# Patient Record
Sex: FEMALE | Race: WHITE | NOT HISPANIC OR LATINO | URBAN - METROPOLITAN AREA
[De-identification: names, ages, dates, MRNs, and addresses within clinical notes are randomized per-mention and may not be internally consistent; named-entity substitution may affect disease eponyms.]

---

## 2023-11-01 ENCOUNTER — OFFICE VISIT (OUTPATIENT)
Dept: FAMILY MEDICINE | Facility: CLINIC | Age: 18
End: 2023-11-01
Payer: COMMERCIAL

## 2023-11-01 VITALS
HEART RATE: 71 BPM | SYSTOLIC BLOOD PRESSURE: 110 MMHG | WEIGHT: 153 LBS | BODY MASS INDEX: 27.11 KG/M2 | TEMPERATURE: 99 F | DIASTOLIC BLOOD PRESSURE: 69 MMHG | HEIGHT: 63 IN

## 2023-11-01 DIAGNOSIS — J45.40 MODERATE PERSISTENT ASTHMA WITHOUT COMPLICATION: ICD-10-CM

## 2023-11-01 DIAGNOSIS — Z23 INFLUENZA VACCINE NEEDED: Primary | ICD-10-CM

## 2023-11-01 PROCEDURE — 99204 PR OFFICE/OUTPT VISIT, NEW, LEVL IV, 45-59 MIN: ICD-10-PCS | Mod: 25,S$GLB,, | Performed by: INTERNAL MEDICINE

## 2023-11-01 PROCEDURE — 99999 PR PBB SHADOW E&M-NEW PATIENT-LVL III: CPT | Mod: PBBFAC,,, | Performed by: INTERNAL MEDICINE

## 2023-11-01 PROCEDURE — 90471 FLU VACCINE (QUAD) GREATER THAN OR EQUAL TO 3YO PRESERVATIVE FREE IM: ICD-10-PCS | Mod: S$GLB,,, | Performed by: INTERNAL MEDICINE

## 2023-11-01 PROCEDURE — 3008F BODY MASS INDEX DOCD: CPT | Mod: CPTII,S$GLB,, | Performed by: INTERNAL MEDICINE

## 2023-11-01 PROCEDURE — 1159F MED LIST DOCD IN RCRD: CPT | Mod: CPTII,S$GLB,, | Performed by: INTERNAL MEDICINE

## 2023-11-01 PROCEDURE — 3074F SYST BP LT 130 MM HG: CPT | Mod: CPTII,S$GLB,, | Performed by: INTERNAL MEDICINE

## 2023-11-01 PROCEDURE — 3078F DIAST BP <80 MM HG: CPT | Mod: CPTII,S$GLB,, | Performed by: INTERNAL MEDICINE

## 2023-11-01 PROCEDURE — 3008F PR BODY MASS INDEX (BMI) DOCUMENTED: ICD-10-PCS | Mod: CPTII,S$GLB,, | Performed by: INTERNAL MEDICINE

## 2023-11-01 PROCEDURE — 3078F PR MOST RECENT DIASTOLIC BLOOD PRESSURE < 80 MM HG: ICD-10-PCS | Mod: CPTII,S$GLB,, | Performed by: INTERNAL MEDICINE

## 2023-11-01 PROCEDURE — 90686 IIV4 VACC NO PRSV 0.5 ML IM: CPT | Mod: S$GLB,,, | Performed by: INTERNAL MEDICINE

## 2023-11-01 PROCEDURE — 3074F PR MOST RECENT SYSTOLIC BLOOD PRESSURE < 130 MM HG: ICD-10-PCS | Mod: CPTII,S$GLB,, | Performed by: INTERNAL MEDICINE

## 2023-11-01 PROCEDURE — 99204 OFFICE O/P NEW MOD 45 MIN: CPT | Mod: 25,S$GLB,, | Performed by: INTERNAL MEDICINE

## 2023-11-01 PROCEDURE — 99999 PR PBB SHADOW E&M-NEW PATIENT-LVL III: ICD-10-PCS | Mod: PBBFAC,,, | Performed by: INTERNAL MEDICINE

## 2023-11-01 PROCEDURE — 1159F PR MEDICATION LIST DOCUMENTED IN MEDICAL RECORD: ICD-10-PCS | Mod: CPTII,S$GLB,, | Performed by: INTERNAL MEDICINE

## 2023-11-01 PROCEDURE — 90471 IMMUNIZATION ADMIN: CPT | Mod: S$GLB,,, | Performed by: INTERNAL MEDICINE

## 2023-11-01 PROCEDURE — 90686 FLU VACCINE (QUAD) GREATER THAN OR EQUAL TO 3YO PRESERVATIVE FREE IM: ICD-10-PCS | Mod: S$GLB,,, | Performed by: INTERNAL MEDICINE

## 2023-11-01 RX ORDER — ALBUTEROL SULFATE 90 UG/1
2 AEROSOL, METERED RESPIRATORY (INHALATION) EVERY 6 HOURS PRN
COMMUNITY

## 2023-11-01 NOTE — PROGRESS NOTES
Assessment/Plan:    Problem List Items Addressed This Visit          Pulmonary    Moderate persistent asthma without complication    Overview     -chronic  -symptoms stable  -remains on BID Dulera  -has PRN DONATO inhaler but denies frequent use  -no longer following with pulmonology          Other Visit Diagnoses       Influenza vaccine needed    -  Primary    Relevant Orders    Influenza - Quadrivalent (PF)            Angelita Allred MD  ______________________________________________________________________________________________________________________________    CC: Establish care    HPI:    Patient is a new patient to me here to establish care.     Previous PCP: pediatrics    Patient is a 18 yo WF with PMH of asthma. Patient diagnosed in childhood. Was followed by pulmonology but has not required to see them in years. She is on a maintenance inhaler but doing well. Has albuterol rescue inhaler but rarely has to use. Denies any current symptoms of SOB, wheezing or cough.     No other new complaints today.  Remaining chronic conditions have been reviewed and remain stable. Further detail as stated above.       reviewed. Flu shot today. Discussed other recommended vaccines.     Past Medical History:  Past Medical History:   Diagnosis Date    Asthma      Past Surgical History:   Procedure Laterality Date    ADENOIDECTOMY  2006    tubes 3 times    MYRINGOTOMY W/ TUBES      TONSILLECTOMY  Oct 2020    WISDOM TOOTH EXTRACTION       Review of patient's allergies indicates:  No Known Allergies  Social History     Tobacco Use    Smoking status: Never    Smokeless tobacco: Never    Tobacco comments:     NEVER   Substance Use Topics    Alcohol use: Not Currently    Drug use: Never     Family History   Problem Relation Age of Onset    Asthma Mother     Miscarriages / Stillbirths Mother     Hearing loss Father     Hypertension Father     Asthma Sister     Asthma Sister      Current Outpatient Medications on  "File Prior to Visit   Medication Sig Dispense Refill    albuterol (PROVENTIL/VENTOLIN HFA) 90 mcg/actuation inhaler Inhale 2 puffs into the lungs every 6 (six) hours as needed. Rescue      mometasone-formoterol (DULERA) 100-5 mcg/actuation HFAA Inhale 1 puff into the lungs.      [DISCONTINUED] clobetasoL (TEMOVATE) 0.05 % external solution Apply topically 2 (two) times daily. 50 mL 3    [DISCONTINUED] ketoconazole (NIZORAL) 2 % shampoo Apply to scalp 2-3 times per week and lather, let sit for 5 minutes prior to rinsing. 120 mL 3     No current facility-administered medications on file prior to visit.       Review of Systems   Constitutional:  Negative for chills, diaphoresis, fatigue and fever.   HENT:  Negative for congestion, ear pain, postnasal drip, sinus pain and sore throat.    Eyes:  Negative for pain and redness.   Respiratory:  Negative for cough, chest tightness and shortness of breath.    Cardiovascular:  Negative for chest pain and leg swelling.   Gastrointestinal:  Negative for abdominal pain, constipation, diarrhea, nausea and vomiting.   Genitourinary:  Negative for dysuria and hematuria.   Musculoskeletal:  Negative for arthralgias and joint swelling.   Skin:  Negative for rash.   Neurological:  Negative for dizziness, syncope and headaches.   Psychiatric/Behavioral:  Negative for dysphoric mood. The patient is not nervous/anxious.      Vitals:    11/01/23 1413   BP: 110/69   Pulse: 71   Temp: 98.6 °F (37 °C)   Weight: 69.4 kg (153 lb)   Height: 5' 3" (1.6 m)       Wt Readings from Last 3 Encounters:   11/01/23 69.4 kg (153 lb) (82 %, Z= 0.93)*   06/08/23 66.2 kg (146 lb) (77 %, Z= 0.74)*   07/29/11 24.1 kg (53 lb 1.7 oz) (46 %, Z= -0.09)*     * Growth percentiles are based on [facility] (Girls, 2-20 Years) data.       Physical Exam  Constitutional:       General: She is not in acute distress.     Appearance: Normal appearance. She is well-developed.   HENT:      Head: Normocephalic and atraumatic. "   Eyes:      Conjunctiva/sclera: Conjunctivae normal.   Cardiovascular:      Rate and Rhythm: Normal rate and regular rhythm.      Pulses: Normal pulses.      Heart sounds: Normal heart sounds. No murmur heard.  Pulmonary:      Effort: Pulmonary effort is normal. No respiratory distress.      Breath sounds: Normal breath sounds. No stridor. No wheezing or rhonchi.   Abdominal:      General: Bowel sounds are normal. There is no distension.      Palpations: Abdomen is soft.      Tenderness: There is no abdominal tenderness.   Musculoskeletal:         General: Normal range of motion.      Cervical back: Normal range of motion and neck supple.   Skin:     General: Skin is warm and dry.      Findings: No rash.   Neurological:      General: No focal deficit present.      Mental Status: She is alert and oriented to person, place, and time.   Psychiatric:         Mood and Affect: Mood normal.         Behavior: Behavior normal.     Health Maintenance   Topic Date Due    Hepatitis C Screening  Never done    Lipid Panel  Never done    HPV Vaccines (1 - 2-dose series) Never done    TETANUS VACCINE  04/12/2031

## 2023-12-12 ENCOUNTER — OFFICE VISIT (OUTPATIENT)
Dept: PRIMARY CARE CLINIC | Facility: CLINIC | Age: 18
End: 2023-12-12
Payer: COMMERCIAL

## 2023-12-12 VITALS
HEART RATE: 84 BPM | DIASTOLIC BLOOD PRESSURE: 84 MMHG | BODY MASS INDEX: 27.44 KG/M2 | SYSTOLIC BLOOD PRESSURE: 122 MMHG | TEMPERATURE: 98 F | OXYGEN SATURATION: 100 % | HEIGHT: 63 IN | WEIGHT: 154.88 LBS

## 2023-12-12 DIAGNOSIS — R10.13 DYSPEPSIA: Primary | ICD-10-CM

## 2023-12-12 PROCEDURE — 99214 PR OFFICE/OUTPT VISIT, EST, LEVL IV, 30-39 MIN: ICD-10-PCS | Mod: S$GLB,,, | Performed by: INTERNAL MEDICINE

## 2023-12-12 PROCEDURE — 99999 PR PBB SHADOW E&M-EST. PATIENT-LVL IV: ICD-10-PCS | Mod: PBBFAC,,, | Performed by: INTERNAL MEDICINE

## 2023-12-12 PROCEDURE — 3008F BODY MASS INDEX DOCD: CPT | Mod: CPTII,S$GLB,, | Performed by: INTERNAL MEDICINE

## 2023-12-12 PROCEDURE — 3074F PR MOST RECENT SYSTOLIC BLOOD PRESSURE < 130 MM HG: ICD-10-PCS | Mod: CPTII,S$GLB,, | Performed by: INTERNAL MEDICINE

## 2023-12-12 PROCEDURE — 3079F DIAST BP 80-89 MM HG: CPT | Mod: CPTII,S$GLB,, | Performed by: INTERNAL MEDICINE

## 2023-12-12 PROCEDURE — 3079F PR MOST RECENT DIASTOLIC BLOOD PRESSURE 80-89 MM HG: ICD-10-PCS | Mod: CPTII,S$GLB,, | Performed by: INTERNAL MEDICINE

## 2023-12-12 PROCEDURE — 99999 PR PBB SHADOW E&M-EST. PATIENT-LVL IV: CPT | Mod: PBBFAC,,, | Performed by: INTERNAL MEDICINE

## 2023-12-12 PROCEDURE — 99214 OFFICE O/P EST MOD 30 MIN: CPT | Mod: S$GLB,,, | Performed by: INTERNAL MEDICINE

## 2023-12-12 PROCEDURE — 3074F SYST BP LT 130 MM HG: CPT | Mod: CPTII,S$GLB,, | Performed by: INTERNAL MEDICINE

## 2023-12-12 PROCEDURE — 1159F PR MEDICATION LIST DOCUMENTED IN MEDICAL RECORD: ICD-10-PCS | Mod: CPTII,S$GLB,, | Performed by: INTERNAL MEDICINE

## 2023-12-12 PROCEDURE — 1159F MED LIST DOCD IN RCRD: CPT | Mod: CPTII,S$GLB,, | Performed by: INTERNAL MEDICINE

## 2023-12-12 PROCEDURE — 3008F PR BODY MASS INDEX (BMI) DOCUMENTED: ICD-10-PCS | Mod: CPTII,S$GLB,, | Performed by: INTERNAL MEDICINE

## 2023-12-12 RX ORDER — PANTOPRAZOLE SODIUM 40 MG/1
40 TABLET, DELAYED RELEASE ORAL DAILY
Qty: 14 TABLET | Refills: 0 | Status: SHIPPED | OUTPATIENT
Start: 2023-12-12 | End: 2023-12-26

## 2023-12-12 NOTE — PROGRESS NOTES
Assessment/Plan:    Problem List Items Addressed This Visit    None  Visit Diagnoses       Dyspepsia    -  Primary  -start PPI x 2 weeks  -start on daily probiotic as well  -provided a list of foods to avoid  -if no improvement in symptoms, will move forward with further work up    Relevant Medications    pantoprazole (PROTONIX) 40 MG tablet            Follow up if symptoms worsen or fail to improve.    Angelita Allred MD  _____________________________________________________________________________________________________________________________________________________    CC: GI complaint    HPI:    Patient is in clinic today as an established patient.    Complaint of nausea, irregular BM's and increased has x 6 weeks. Symptoms started after a weekend with a lot of different parties/showers and ate a lot of food that she does not normally eat. Reports symptoms initially started with N/V, lasting for one night. No longer having episodes of vomiting but continuing to have nausea, however this has improved some. Continuing to have irregular BM's since then. Reports sometimes intermittent constipation and loose stool. She denies ever having any similar episodes in the past. BM's have always been regular. Denies blood in stool. Reports increased gas production but denies any significant abdominal pain. She is now on her usual regular diet. Denies any new medications or supplements.     No other new complaints today.      Current Outpatient Medications on File Prior to Visit   Medication Sig Dispense Refill    albuterol (PROVENTIL/VENTOLIN HFA) 90 mcg/actuation inhaler Inhale 2 puffs into the lungs every 6 (six) hours as needed. Rescue      mometasone-formoterol (DULERA) 100-5 mcg/actuation HFAA Inhale 1 puff into the lungs.       No current facility-administered medications on file prior to visit.       Review of Systems   Constitutional:  Negative for chills, diaphoresis, fatigue and fever.   HENT:  Negative for  "congestion, ear pain, postnasal drip, sinus pain and sore throat.    Eyes:  Negative for pain and redness.   Respiratory:  Negative for cough, chest tightness and shortness of breath.    Cardiovascular:  Negative for chest pain and leg swelling.   Gastrointestinal:  Positive for constipation and nausea. Negative for abdominal pain, diarrhea and vomiting.   Genitourinary:  Negative for dysuria and hematuria.   Musculoskeletal:  Negative for arthralgias and joint swelling.   Skin:  Negative for rash.   Neurological:  Negative for dizziness, syncope and headaches.   Psychiatric/Behavioral:  Negative for dysphoric mood. The patient is not nervous/anxious.      Vitals:    12/12/23 0749   BP: 122/84   Pulse: 84   Temp: 98.2 °F (36.8 °C)   TempSrc: Temporal   SpO2: 100%   Weight: 70.2 kg (154 lb 14 oz)   Height: 5' 3" (1.6 m)       Wt Readings from Last 3 Encounters:   12/12/23 70.2 kg (154 lb 14 oz) (84 %, Z= 0.98)*   11/01/23 69.4 kg (153 lb) (82 %, Z= 0.93)*   06/08/23 66.2 kg (146 lb) (77 %, Z= 0.74)*     * Growth percentiles are based on CDC (Girls, 2-20 Years) data.       Physical Exam  Constitutional:       General: She is not in acute distress.     Appearance: Normal appearance. She is well-developed.   HENT:      Head: Normocephalic and atraumatic.   Eyes:      Conjunctiva/sclera: Conjunctivae normal.   Cardiovascular:      Rate and Rhythm: Normal rate and regular rhythm.      Pulses: Normal pulses.      Heart sounds: Normal heart sounds. No murmur heard.  Pulmonary:      Effort: Pulmonary effort is normal. No respiratory distress.      Breath sounds: Normal breath sounds.   Abdominal:      General: Bowel sounds are normal. There is no distension.      Palpations: Abdomen is soft. There is no mass.      Tenderness: There is no abdominal tenderness. There is no guarding or rebound.      Hernia: No hernia is present.   Musculoskeletal:         General: Normal range of motion.      Cervical back: Normal range of " motion and neck supple.   Skin:     General: Skin is warm and dry.      Findings: No rash.   Neurological:      General: No focal deficit present.      Mental Status: She is alert and oriented to person, place, and time.   Psychiatric:         Mood and Affect: Mood normal.         Behavior: Behavior normal.     Health Maintenance   Topic Date Due    Hepatitis C Screening  Never done    Lipid Panel  Never done    HPV Vaccines (1 - 2-dose series) Never done    TETANUS VACCINE  04/12/2031

## 2023-12-12 NOTE — PATIENT INSTRUCTIONS
-start pantoprazole x 2 weeks  -start daily probiotic, such as Align  -avoid foods that cause increase gas production; see below:    Foods commonly associated with gas and bloating  Milk and dairy products Cheese (may/may not relate to lactose), ice cream, milk   Vegetables Asparagus, broccoli, brussel sprouts, cabbage, cauliflower, celery, corn, cucumber, kohlrabi, leeks, onions, parsnips, potatoes, radishes, rutabaga, turnips   Fruits Apples, apricots, bananas, peaches, pears, prunes, raisins   Whole grains Bagels, bran/bran cereal, pretzels, wheat and oats, wheat germ   Legumes Baked beans, beans, lima beans, peas, soybeans   Fatty foods Fried foods, pork*   Liquids Beer, carbonated beverages, carbonated medications   Miscellaneous Artificial sweeteners, chewing gum   * Pork is associated with foul-smelling gas.  Graphic 744532 Version 2.0  © 2023 [employer] and/or its affiliates. All Rights Reserved.

## 2023-12-21 ENCOUNTER — PATIENT MESSAGE (OUTPATIENT)
Dept: PRIMARY CARE CLINIC | Facility: CLINIC | Age: 18
End: 2023-12-21
Payer: COMMERCIAL

## 2024-10-22 ENCOUNTER — E-VISIT (OUTPATIENT)
Dept: PRIMARY CARE CLINIC | Facility: CLINIC | Age: 19
End: 2024-10-22
Payer: COMMERCIAL

## 2024-10-22 DIAGNOSIS — J40 BRONCHITIS: Primary | ICD-10-CM

## 2024-10-22 DIAGNOSIS — J45.901 EXACERBATION OF ASTHMA, UNSPECIFIED ASTHMA SEVERITY, UNSPECIFIED WHETHER PERSISTENT: ICD-10-CM

## 2024-10-24 RX ORDER — PROMETHAZINE HYDROCHLORIDE AND DEXTROMETHORPHAN HYDROBROMIDE 6.25; 15 MG/5ML; MG/5ML
5 SYRUP ORAL EVERY 6 HOURS PRN
Qty: 118 ML | Refills: 0 | Status: SHIPPED | OUTPATIENT
Start: 2024-10-24 | End: 2024-11-03

## 2024-10-24 RX ORDER — PREDNISONE 20 MG/1
40 TABLET ORAL DAILY
Qty: 10 TABLET | Refills: 0 | Status: SHIPPED | OUTPATIENT
Start: 2024-10-24 | End: 2024-10-29

## 2024-10-24 NOTE — PROGRESS NOTES
Patient ID: Chichi Mota is a 18 y.o. female.    Chief Complaint: General Illness (Entered automatically based on patient selection in [employer].)    The patient initiated a request through [employer] on 10/22/2024 for evaluation and management with a chief complaint of General Illness (Entered automatically based on patient selection in [employer].)     I evaluated the questionnaire submission on 10/22/24.    [facility]-Cough And Cold    10/22/2024  1:36 PM CDT - Filed by Patient   What do you need help with? Asthma   Do you agree to participate in an E-Visit? Yes   If you have any of the following symptoms, please present to your local emergency room or call 911:  I acknowledge   Are you pregnant, could you be pregnant, or are you breast feeding? None of the above   What is the main issue you would like addressed today? Cough - tightness in Chest   Please describe your symptoms Cough, mucus, running nose, - voice gone, started with sore throat, threw up from coughing   Where is your problem located? Chest   How severe are your symptoms? Moderate   Have you had these symptoms before? Yes   How long have you been having these symptoms? For a week   Please list any medications or treatments you have used for your condition and indicate if it was effective or not. Dulera 100mcg/5mcg -  2 puffs twice daily;  Robitusin   What makes this feel better? Inhaler made me cough mucus up;  water   What makes this feel worse? Not being able to cough   Are these symptoms related to a condition that you currently have? Yes   What is the condition? Asthma, Haven't had any issues until I got a sore Throat   When were you last seen for this condition?    Please describe any probable cause for these symptoms Haven't been using inhaler as I've had no issues   Provide any additional information you feel is important.    Please attach any relevant images or files    Are you able to take your vital signs? Yes   Systolic Blood  Pressure: 126   Diastolic Blood Pressure: 89   Weight: 165   Height: 63   Pulse: 122   Temperature: 97.8   Respiration rate:    Pulse Oxygen:           Active Problem List with Overview Notes    Diagnosis Date Noted    Moderate persistent asthma without complication 11/01/2023     -chronic  -symptoms stable  -remains on BID Dulera  -has PRN DONATO inhaler but denies frequent use  -no longer following with pulmonology        Recent Labs Obtained:  No visits with results within 7 Day(s) from this visit.   Latest known visit with results is:   Orders Only on 11/28/2011   Component Date Value Ref Range Status    WBC 11/28/2011 6.01  4.5 - 13.5 K/uL Final    RBC 11/28/2011 4.58  4.0 - 5.2 M/uL Final    Hemoglobin 11/28/2011 13.1  11.5 - 15.5 gm/dl Final    Hematocrit 11/28/2011 38.3  35.0 - 45.0 % Final    MCV 11/28/2011 83.6  77 - 95 fL Final    MCH 11/28/2011 28.6  25 - 33 pg Final    MCHC 11/28/2011 34.2  31 - 36 % Final    RDW 11/28/2011 12.6  11.5 - 14.5 % Final    Gran % 11/28/2011 47.4  33 - 59 % Final    Lymph % 11/28/2011 40.1  33 - 50 % Final    Mono % 11/28/2011 9.0 (H)  0.0 - 5.9 % Final    Eosinophil % 11/28/2011 2.8  0.0 - 3.7 % Final    Basophil % 11/28/2011 0.7  0 - 1.5 % Final    Gran # (ANC) 11/28/2011 2.9  1.5 - 8.0 K/uL Final    Lymph # 11/28/2011 2.4  1.5 - 6.8 K/uL Final    Mono # 11/28/2011 0.5  0.0 - 0.8 K/uL Final    Eos # 11/28/2011 0.2  0 - 0.5 K/uL Final    Baso # 11/28/2011 0.0  0.0 - 0.2 K/uL Final    Platelets 11/28/2011 394 (H)  150 - 350 K/uL Final    MPV 11/28/2011 10.0  9.2 - 12.9 fL Final    IgG 11/28/2011 517 (L)  650 - 1600 mg/dl Final    Cord Blood Reference Range: 650 - 1600 mg/dL    IgA 11/28/2011 97  35 - 200 mg/dl Final    Cord Blood Reference Range: < 5 mg/dL    IgM 11/28/2011 138  45 - 200 mg/dl Final    Cord Blood Reference Range: < 25mg/dL    Total IgE 11/28/2011 < 35.0  0 - 90 IU/ml Final    Tetanus Toxoid IgG Ab 11/28/2011 0.74  IU/mL Final    Comment: Tetanus Toxoid IgG Ab:                             Research Use Only                            The minimum level of protective antibody in the normal                            population is between 0.01 and 0.15 IU/mL. The majority                            of vaccinated individuals should demonstrate protective                            levels of antibody > 0.15 IU/mL.     S.pneumoniae Type 1 11/28/2011 2.8  >1.0 ug/mL Final    S.pneumoniae Type 3 11/28/2011 1.8  >1.0 ug/mL Final    Strep pneumo Type 4 11/28/2011 2.0  >1.0 ug/mL Final    S.pneumoniae Type 5 11/28/2011 5.2  >1.0 ug/mL Final    S.pneumoniae Type 6B 11/28/2011 17.2  >1.0 ug/mL Final    S.pneumoniae Type 7F 11/28/2011 4.5  >1.0 ug/mL Final    S.pneumoniae Type 8 11/28/2011 1.7  >1.0 ug/mL Final    S.pneumoniae Type 9N 11/28/2011 0.2 (A)  >1.0 ug/mL Final    S.pneumoniae Type 9V Abs 11/28/2011 3.8  >1.0 ug/mL Final    S.pneumoniae Type 12F 11/28/2011 <0.2 (A)  >1.0 ug/mL Final    Strep pneumo Type 14 11/28/2011 5.8  >1.0 ug/mL Final    S.pneumoniae Type 18C 11/28/2011 1.6  >1.0 ug/mL Final    S.pneumoniae Type 19F 11/28/2011 9.8  >1.0 ug/mL Final    S.pneumoniae Type 23F 11/28/2011 2.3  >1.0 ug/mL Final       Encounter Diagnoses   Name Primary?    Bronchitis Yes    Exacerbation of asthma, unspecified asthma severity, unspecified whether persistent         No orders of the defined types were placed in this encounter.     Medications Ordered This Encounter   Medications    predniSONE (DELTASONE) 20 MG tablet     Sig: Take 2 tablets (40 mg total) by mouth once daily. for 5 days     Dispense:  10 tablet     Refill:  0    promethazine-dextromethorphan (PROMETHAZINE-DM) 6.25-15 mg/5 mL Syrp     Sig: Take 5 mLs by mouth every 6 (six) hours as needed (cough).     Dispense:  118 mL     Refill:  0        E-Visit Time Tracking:

## 2025-03-11 ENCOUNTER — OFFICE VISIT (OUTPATIENT)
Dept: PRIMARY CARE CLINIC | Facility: CLINIC | Age: 20
End: 2025-03-11
Payer: COMMERCIAL

## 2025-03-11 VITALS
HEIGHT: 63 IN | SYSTOLIC BLOOD PRESSURE: 118 MMHG | WEIGHT: 162.69 LBS | BODY MASS INDEX: 28.82 KG/M2 | HEART RATE: 96 BPM | OXYGEN SATURATION: 99 % | TEMPERATURE: 98 F | DIASTOLIC BLOOD PRESSURE: 80 MMHG

## 2025-03-11 DIAGNOSIS — Z11.4 SCREENING FOR HIV (HUMAN IMMUNODEFICIENCY VIRUS): ICD-10-CM

## 2025-03-11 DIAGNOSIS — Z13.220 ENCOUNTER FOR LIPID SCREENING FOR CARDIOVASCULAR DISEASE: ICD-10-CM

## 2025-03-11 DIAGNOSIS — Z00.00 ENCOUNTER FOR ANNUAL HEALTH EXAMINATION: Primary | ICD-10-CM

## 2025-03-11 DIAGNOSIS — Z13.1 DIABETES MELLITUS SCREENING: ICD-10-CM

## 2025-03-11 DIAGNOSIS — Z13.6 ENCOUNTER FOR LIPID SCREENING FOR CARDIOVASCULAR DISEASE: ICD-10-CM

## 2025-03-11 DIAGNOSIS — Z11.59 ENCOUNTER FOR HEPATITIS C SCREENING TEST FOR LOW RISK PATIENT: ICD-10-CM

## 2025-03-11 DIAGNOSIS — R21 RASH: ICD-10-CM

## 2025-03-11 PROCEDURE — 3079F DIAST BP 80-89 MM HG: CPT | Mod: CPTII,S$GLB,, | Performed by: INTERNAL MEDICINE

## 2025-03-11 PROCEDURE — 3044F HG A1C LEVEL LT 7.0%: CPT | Mod: CPTII,S$GLB,, | Performed by: INTERNAL MEDICINE

## 2025-03-11 PROCEDURE — 99999 PR PBB SHADOW E&M-EST. PATIENT-LVL IV: CPT | Mod: PBBFAC,,, | Performed by: INTERNAL MEDICINE

## 2025-03-11 PROCEDURE — 3008F BODY MASS INDEX DOCD: CPT | Mod: CPTII,S$GLB,, | Performed by: INTERNAL MEDICINE

## 2025-03-11 PROCEDURE — 99395 PREV VISIT EST AGE 18-39: CPT | Mod: S$GLB,,, | Performed by: INTERNAL MEDICINE

## 2025-03-11 PROCEDURE — 1159F MED LIST DOCD IN RCRD: CPT | Mod: CPTII,S$GLB,, | Performed by: INTERNAL MEDICINE

## 2025-03-11 PROCEDURE — 3074F SYST BP LT 130 MM HG: CPT | Mod: CPTII,S$GLB,, | Performed by: INTERNAL MEDICINE

## 2025-03-11 RX ORDER — TRIAMCINOLONE ACETONIDE 1 MG/G
OINTMENT TOPICAL 2 TIMES DAILY
Qty: 30 G | Refills: 0 | Status: SHIPPED | OUTPATIENT
Start: 2025-03-11

## 2025-03-14 ENCOUNTER — LAB VISIT (OUTPATIENT)
Dept: LAB | Facility: HOSPITAL | Age: 20
End: 2025-03-14
Attending: INTERNAL MEDICINE
Payer: COMMERCIAL

## 2025-03-14 DIAGNOSIS — Z13.6 ENCOUNTER FOR LIPID SCREENING FOR CARDIOVASCULAR DISEASE: ICD-10-CM

## 2025-03-14 DIAGNOSIS — Z13.1 DIABETES MELLITUS SCREENING: ICD-10-CM

## 2025-03-14 DIAGNOSIS — Z11.4 SCREENING FOR HIV (HUMAN IMMUNODEFICIENCY VIRUS): ICD-10-CM

## 2025-03-14 DIAGNOSIS — Z11.59 ENCOUNTER FOR HEPATITIS C SCREENING TEST FOR LOW RISK PATIENT: ICD-10-CM

## 2025-03-14 DIAGNOSIS — Z13.220 ENCOUNTER FOR LIPID SCREENING FOR CARDIOVASCULAR DISEASE: ICD-10-CM

## 2025-03-14 DIAGNOSIS — Z00.00 ENCOUNTER FOR ANNUAL HEALTH EXAMINATION: ICD-10-CM

## 2025-03-14 LAB
ALBUMIN SERPL BCP-MCNC: 4.3 G/DL (ref 3.5–5.2)
ALP SERPL-CCNC: 76 U/L (ref 40–150)
ALT SERPL W/O P-5'-P-CCNC: 14 U/L (ref 10–44)
ANION GAP SERPL CALC-SCNC: 14 MMOL/L (ref 8–16)
AST SERPL-CCNC: 23 U/L (ref 10–40)
BASOPHILS # BLD AUTO: 0.05 K/UL (ref 0–0.2)
BASOPHILS NFR BLD: 0.9 % (ref 0–1.9)
BILIRUB SERPL-MCNC: 0.5 MG/DL (ref 0.1–1)
BUN SERPL-MCNC: 11 MG/DL (ref 6–20)
CALCIUM SERPL-MCNC: 9.7 MG/DL (ref 8.7–10.5)
CHLORIDE SERPL-SCNC: 108 MMOL/L (ref 95–110)
CHOLEST SERPL-MCNC: 167 MG/DL (ref 120–199)
CHOLEST/HDLC SERPL: 3.5 {RATIO} (ref 2–5)
CO2 SERPL-SCNC: 19 MMOL/L (ref 23–29)
CREAT SERPL-MCNC: 0.7 MG/DL (ref 0.5–1.4)
DIFFERENTIAL METHOD BLD: NORMAL
EOSINOPHIL # BLD AUTO: 0.1 K/UL (ref 0–0.5)
EOSINOPHIL NFR BLD: 1.3 % (ref 0–8)
ERYTHROCYTE [DISTWIDTH] IN BLOOD BY AUTOMATED COUNT: 12.2 % (ref 11.5–14.5)
EST. GFR  (NO RACE VARIABLE): >60 ML/MIN/1.73 M^2
ESTIMATED AVG GLUCOSE: 85 MG/DL (ref 68–131)
GLUCOSE SERPL-MCNC: 73 MG/DL (ref 70–110)
HBA1C MFR BLD: 4.6 % (ref 4–5.6)
HCT VFR BLD AUTO: 41.3 % (ref 37–48.5)
HCV AB SERPL QL IA: NORMAL
HDLC SERPL-MCNC: 48 MG/DL (ref 40–75)
HDLC SERPL: 28.7 % (ref 20–50)
HGB BLD-MCNC: 13.8 G/DL (ref 12–16)
HIV 1+2 AB+HIV1 P24 AG SERPL QL IA: NORMAL
IMM GRANULOCYTES # BLD AUTO: 0.01 K/UL (ref 0–0.04)
IMM GRANULOCYTES NFR BLD AUTO: 0.2 % (ref 0–0.5)
LDLC SERPL CALC-MCNC: 105.8 MG/DL (ref 63–159)
LYMPHOCYTES # BLD AUTO: 1.6 K/UL (ref 1–4.8)
LYMPHOCYTES NFR BLD: 29.1 % (ref 18–48)
MCH RBC QN AUTO: 30.2 PG (ref 27–31)
MCHC RBC AUTO-ENTMCNC: 33.4 G/DL (ref 32–36)
MCV RBC AUTO: 90 FL (ref 82–98)
MONOCYTES # BLD AUTO: 0.7 K/UL (ref 0.3–1)
MONOCYTES NFR BLD: 12.6 % (ref 4–15)
NEUTROPHILS # BLD AUTO: 3.1 K/UL (ref 1.8–7.7)
NEUTROPHILS NFR BLD: 55.9 % (ref 38–73)
NONHDLC SERPL-MCNC: 119 MG/DL
NRBC BLD-RTO: 0 /100 WBC
PLATELET # BLD AUTO: 355 K/UL (ref 150–450)
PMV BLD AUTO: 11 FL (ref 9.2–12.9)
POTASSIUM SERPL-SCNC: 4.3 MMOL/L (ref 3.5–5.1)
PROT SERPL-MCNC: 7.3 G/DL (ref 6–8.4)
RBC # BLD AUTO: 4.57 M/UL (ref 4–5.4)
SODIUM SERPL-SCNC: 141 MMOL/L (ref 136–145)
TRIGL SERPL-MCNC: 66 MG/DL (ref 30–150)
WBC # BLD AUTO: 5.54 K/UL (ref 3.9–12.7)

## 2025-03-14 PROCEDURE — 85025 COMPLETE CBC W/AUTO DIFF WBC: CPT | Performed by: INTERNAL MEDICINE

## 2025-03-14 PROCEDURE — 80053 COMPREHEN METABOLIC PANEL: CPT | Performed by: INTERNAL MEDICINE

## 2025-03-14 PROCEDURE — 87389 HIV-1 AG W/HIV-1&-2 AB AG IA: CPT | Performed by: INTERNAL MEDICINE

## 2025-03-14 PROCEDURE — 80061 LIPID PANEL: CPT | Performed by: INTERNAL MEDICINE

## 2025-03-14 PROCEDURE — 86803 HEPATITIS C AB TEST: CPT | Performed by: INTERNAL MEDICINE

## 2025-03-14 PROCEDURE — 83036 HEMOGLOBIN GLYCOSYLATED A1C: CPT | Performed by: INTERNAL MEDICINE

## 2025-03-24 ENCOUNTER — RESULTS FOLLOW-UP (OUTPATIENT)
Dept: FAMILY MEDICINE | Facility: CLINIC | Age: 20
End: 2025-03-24

## 2025-03-25 NOTE — PROGRESS NOTES
Results have been released via [employer]. Please verify that these have been viewed by patient. If not, please call patient with results.    I have reviewed your recent results.    The screening A1C test was normal. This indicates that you do not currently have a diagnosis of prediabetes or diabetes.  CBC NORMAL-The CBC appears to be stable at this time with no sign of major anemia, abnormal white count or platelet abnormality.  CMP/BMP NORMAL-The electrolytes all appear stable at this time.  This includes kidney functions along with routine electrolytes like sugar, potassium and sodium.  If it was a CMP test, the liver enzymes were noted to be stable also.  HEPATITIS C/HIV SCREEN NORMAL-The routine screening for Hepatitis C and HIV was negative.  LIPID NORMAL SCREEN-The cholesterol panel screening showed levels that are considered at target at this time.  Recheck each year.   TSH NORMAL-The TSH screening indicated a normal function of the thyroid.    Please let me know if you have any additional questions or concerns about above.

## 2025-03-25 NOTE — PROGRESS NOTES
This note is specifically for wellness visit performed today.       Assessment / Plan:      Patient here for annual wellness exam. Health maintenance was reviewed and ordered.    Complete history and physical was completed today.  Complete and thorough medication reconciliation was performed.  Discussed risks and benefits of medications.  Advised patient on orders and health maintenance. Continue current medications listed on your summary sheet.    All questions were answered. Patient had no further concerns. Advised of diagnoses and plan.     1. Encounter for annual health examination  -     Comprehensive Metabolic Panel; Standing  -     CBC Auto Differential; Standing    2. Encounter for lipid screening for cardiovascular disease  -     Lipid Panel; Standing    3. Encounter for hepatitis C screening test for low risk patient  -     Hepatitis C antibody; Future; Expected date: 03/11/2025    4. Screening for HIV (human immunodeficiency virus)  -     HIV 1/2 Ag/Ab (4th Gen); Future; Expected date: 03/11/2025    5. Diabetes mellitus screening  -     Hemoglobin A1C; Standing    6. Rash  -     triamcinolone acetonide 0.1% (KENALOG) 0.1 % ointment; Apply topically 2 (two) times daily.  Dispense: 30 g; Refill: 0  -     Ambulatory referral/consult to Dermatology; Future; Expected date: 03/18/2025        No follow-ups on file.    Angelita Allred MD       WELLNESS EXAM      Patient ID: Chichi Mota is a 19 y.o. female.  has a past medical history of Asthma.     Chief Complaint:  Encounter for wellness exam    Well Adult Physical: Patient here for a comprehensive physical exam.     Health Maintenance Topics with due status: Not Due       Topic Last Completion Date    TETANUS VACCINE 04/12/2021    RSV Vaccine (Age 60+ and Pregnant patients) Not Due        ==============================================    Health Maintenance Due   Topic Date Due    HPV Vaccines (1 - 3-dose series) Never done    Influenza Vaccine (1)  09/01/2024    COVID-19 Vaccine (1 - 2024-25 season) Never done    Pap Smear  Never done       Past Medical History:  Past Medical History:   Diagnosis Date    Asthma      Past Surgical History:   Procedure Laterality Date    ADENOIDECTOMY  2006    tubes 3 times    MYRINGOTOMY W/ TUBES      TONSILLECTOMY  Oct 2020    WISDOM TOOTH EXTRACTION       Review of patient's allergies indicates:  No Known Allergies  Medications Ordered Prior to Encounter[1]  Social History[2]  Family History   Problem Relation Name Age of Onset    Asthma Mother Twyla Mota     Miscarriages / Stillbirths Mother Twyla Mota     Hearing loss Father Evan Mota     Hypertension Father Evan Mota     Asthma Sister Joyce Mota     Asthma Sister Clint Mota Jose L        Review of Systems   Constitutional:  Negative for chills, fever and malaise/fatigue.   HENT:  Negative for congestion and sore throat.    Eyes:  Negative for blurred vision and double vision.   Respiratory:  Negative for cough and shortness of breath.    Cardiovascular:  Negative for chest pain, palpitations and leg swelling.   Gastrointestinal:  Negative for abdominal pain, constipation and diarrhea.   Genitourinary:  Negative for dysuria and frequency.   Musculoskeletal:  Negative for back pain and joint pain.   Neurological:  Negative for headaches.   Psychiatric/Behavioral:  Negative for depression. The patient is not nervous/anxious.            Objective:      Vitals:    03/11/25 1516   BP: 118/80   Pulse: 96   Temp: 98.1 °F (36.7 °C)     Body mass index is 28.82 kg/m².     Physical Exam  Constitutional:       General: She is not in acute distress.     Appearance: Normal appearance. She is well-developed.   HENT:      Head: Normocephalic and atraumatic.   Eyes:      Conjunctiva/sclera: Conjunctivae normal.   Cardiovascular:      Rate and Rhythm: Normal rate and regular rhythm.      Pulses: Normal pulses.      Heart sounds: Normal heart sounds. No  murmur heard.  Pulmonary:      Effort: Pulmonary effort is normal. No respiratory distress.      Breath sounds: Normal breath sounds.   Abdominal:      General: Bowel sounds are normal. There is no distension.      Palpations: Abdomen is soft.      Tenderness: There is no abdominal tenderness.   Musculoskeletal:         General: Normal range of motion.      Cervical back: Normal range of motion and neck supple.   Skin:     General: Skin is warm and dry.      Findings: No rash.   Neurological:      General: No focal deficit present.      Mental Status: She is alert and oriented to person, place, and time.   Psychiatric:         Mood and Affect: Mood normal.         Behavior: Behavior normal.             All labs, imaging and procedures performed since last visit have been personally reviewed.          [1]   Current Outpatient Medications on File Prior to Visit   Medication Sig Dispense Refill    albuterol (PROVENTIL/VENTOLIN HFA) 90 mcg/actuation inhaler Inhale 2 puffs into the lungs every 6 (six) hours as needed. Rescue      mometasone-formoterol (DULERA) 100-5 mcg/actuation HFAA Inhale 1 puff into the lungs.       No current facility-administered medications on file prior to visit.   [2]   Social History  Socioeconomic History    Marital status: Single   Tobacco Use    Smoking status: Never    Smokeless tobacco: Never    Tobacco comments:     NEVER   Substance and Sexual Activity    Alcohol use: Not Currently    Drug use: Never    Sexual activity: Not Currently     Birth control/protection: Abstinence     Social Drivers of Health     Financial Resource Strain: Low Risk  (3/5/2025)    Overall Financial Resource Strain (CARDIA)     Difficulty of Paying Living Expenses: Not hard at all   Food Insecurity: No Food Insecurity (3/5/2025)    Hunger Vital Sign     Worried About Running Out of Food in the Last Year: Never true     Ran Out of Food in the Last Year: Never true   Transportation Needs: No Transportation Needs  (3/5/2025)    PRAPARE - Transportation     Lack of Transportation (Medical): No     Lack of Transportation (Non-Medical): No   Physical Activity: Sufficiently Active (3/5/2025)    Exercise Vital Sign     Days of Exercise per Week: 4 days     Minutes of Exercise per Session: 60 min   Stress: Stress Concern Present (3/5/2025)    [employer] - Occupational Stress Questionnaire     Feeling of Stress : To some extent   Housing Stability: Low Risk  (3/5/2025)    Housing Stability Vital Sign     Unable to Pay for Housing in the Last Year: No     Number of Times Moved in the Last Year: 0     Homeless in the Last Year: No

## 2025-06-18 ENCOUNTER — PATIENT MESSAGE (OUTPATIENT)
Dept: RESEARCH | Facility: HOSPITAL | Age: 20
End: 2025-06-18
Payer: COMMERCIAL